# Patient Record
Sex: MALE | Race: WHITE
[De-identification: names, ages, dates, MRNs, and addresses within clinical notes are randomized per-mention and may not be internally consistent; named-entity substitution may affect disease eponyms.]

---

## 2020-10-22 ENCOUNTER — HOSPITAL ENCOUNTER (OUTPATIENT)
Dept: HOSPITAL 92 - LABBT | Age: 60
Discharge: HOME | End: 2020-10-22
Attending: OPHTHALMOLOGY
Payer: COMMERCIAL

## 2020-10-22 DIAGNOSIS — H54.7: Primary | ICD-10-CM

## 2020-10-22 DIAGNOSIS — Z20.828: ICD-10-CM

## 2020-10-22 PROCEDURE — U0003 INFECTIOUS AGENT DETECTION BY NUCLEIC ACID (DNA OR RNA); SEVERE ACUTE RESPIRATORY SYNDROME CORONAVIRUS 2 (SARS-COV-2) (CORONAVIRUS DISEASE [COVID-19]), AMPLIFIED PROBE TECHNIQUE, MAKING USE OF HIGH THROUGHPUT TECHNOLOGIES AS DESCRIBED BY CMS-2020-01-R: HCPCS

## 2020-10-22 PROCEDURE — 87635 SARS-COV-2 COVID-19 AMP PRB: CPT

## 2020-10-27 ENCOUNTER — HOSPITAL ENCOUNTER (OUTPATIENT)
Dept: HOSPITAL 92 - SDC | Age: 60
Discharge: HOME | End: 2020-10-27
Attending: OPHTHALMOLOGY
Payer: COMMERCIAL

## 2020-10-27 VITALS — BODY MASS INDEX: 25.7 KG/M2

## 2020-10-27 DIAGNOSIS — Z79.899: ICD-10-CM

## 2020-10-27 DIAGNOSIS — H43.391: Primary | ICD-10-CM

## 2020-10-27 DIAGNOSIS — Z79.82: ICD-10-CM

## 2020-10-27 DIAGNOSIS — I10: ICD-10-CM

## 2020-10-27 PROCEDURE — 08T43ZZ RESECTION OF RIGHT VITREOUS, PERCUTANEOUS APPROACH: ICD-10-PCS | Performed by: OPHTHALMOLOGY

## 2020-10-27 NOTE — OP
DATE OF PROCEDURE:  10/27/2020



PREOPERATIVE DIAGNOSIS:  Vitreous opacities, right eye.



POSTOPERATIVE DIAGNOSIS:  Vitreous opacities, right eye.



PROCEDURE PERFORMED:  25-gauge pars plana vitrectomy, right eye.



ESTIMATED BLOOD LOSS:  None.



SPECIMENS REMOVED:  None.



COMPLICATIONS:  None.



ANESTHESIA:  MAC with sub-Tenon's block.



DESCRIPTION OF PROCEDURE:  The patient was identified in the preoperative holding

area.  The correct eye being the right eye was marked for surgery.  The patient was

taken to the operating room, where MAC anesthesia was induced.  The right eye was

prepped and draped in usual sterile ophthalmic fashion for surgery.  A wire-clip lid

speculum was placed. 



An inferonasal conjunctival peritomy was fashioned with Ju scissors for

administration of sub-Tenon's block.  The block consisted of 1:1 ratio of 4%

lidocaine and 0.75% Marcaine.  A total of 5 mL was administered. 



A standard 25-gauge pars plana vitrectomy platform was fashioned with trocars placed

approximately 4 mm from the limbus.  The infusion was noted to be within the

vitreous cavity prior to being turned on to infusion pressure of 30 mmHg.  The light

pipe and microvitrector were introduced in the eye under visualization the BIOM

viewing system.  A significant amount of vitreous opacities was identified.  A

careful core and peripheral shave vitrectomy were performed with the assistance of

Kenalog. 



Following completion of vitrectomy, a complete 360-degree scleral depressed exam of

the periphery was performed and no defects were identified. 



The cannulas were sequentially removed, and all sclerotomies were sutured with 8-0

Vicryl suture.  Following suturing, all sclerotomies were noted to be watertight.

Subconjunctival Ancef and Kenalog were injected.  The wire-clip lid speculum was

removed followed by application of TobraDex ophthalmic ointment and a light patch

and shield.  The patient tolerated the procedure well and was taken to outpatient

recovery area in good condition. 







Job ID:  [id number]